# Patient Record
Sex: FEMALE | Race: WHITE | NOT HISPANIC OR LATINO | URBAN - METROPOLITAN AREA
[De-identification: names, ages, dates, MRNs, and addresses within clinical notes are randomized per-mention and may not be internally consistent; named-entity substitution may affect disease eponyms.]

---

## 2023-05-22 ENCOUNTER — HOSPITAL ENCOUNTER (OUTPATIENT)
Dept: DATA CONVERSION | Facility: HOSPITAL | Age: 23
End: 2023-05-23
Attending: STUDENT IN AN ORGANIZED HEALTH CARE EDUCATION/TRAINING PROGRAM | Admitting: STUDENT IN AN ORGANIZED HEALTH CARE EDUCATION/TRAINING PROGRAM
Payer: COMMERCIAL

## 2023-05-22 DIAGNOSIS — F17.200 NICOTINE DEPENDENCE, UNSPECIFIED, UNCOMPLICATED: ICD-10-CM

## 2023-05-22 DIAGNOSIS — M85.40 SOLITARY BONE CYST, UNSPECIFIED SITE: ICD-10-CM

## 2023-05-23 LAB
ANION GAP IN SER/PLAS: 13 MMOL/L (ref 10–20)
CALCIUM (MG/DL) IN SER/PLAS: 8.7 MG/DL (ref 8.6–10.6)
CARBON DIOXIDE, TOTAL (MMOL/L) IN SER/PLAS: 23 MMOL/L (ref 21–32)
CHLORIDE (MMOL/L) IN SER/PLAS: 107 MMOL/L (ref 98–107)
CREATININE (MG/DL) IN SER/PLAS: 0.67 MG/DL (ref 0.5–1.05)
ERYTHROCYTE DISTRIBUTION WIDTH (RATIO) BY AUTOMATED COUNT: 12.3 % (ref 11.5–14.5)
ERYTHROCYTE DISTRIBUTION WIDTH (RATIO) BY AUTOMATED COUNT: NORMAL
ERYTHROCYTE MEAN CORPUSCULAR HEMOGLOBIN CONCENTRATION (G/DL) BY AUTOMATED: 31.4 G/DL (ref 32–36)
ERYTHROCYTE MEAN CORPUSCULAR HEMOGLOBIN CONCENTRATION (G/DL) BY AUTOMATED: NORMAL
ERYTHROCYTE MEAN CORPUSCULAR VOLUME (FL) BY AUTOMATED COUNT: 98 FL (ref 80–100)
ERYTHROCYTE MEAN CORPUSCULAR VOLUME (FL) BY AUTOMATED COUNT: NORMAL
ERYTHROCYTES (10*6/UL) IN BLOOD BY AUTOMATED COUNT: 3.11 X10E12/L (ref 4–5.2)
ERYTHROCYTES (10*6/UL) IN BLOOD BY AUTOMATED COUNT: NORMAL
GFR FEMALE: >90 ML/MIN/1.73M2
GLUCOSE (MG/DL) IN SER/PLAS: 88 MG/DL (ref 74–99)
HEMATOCRIT (%) IN BLOOD BY AUTOMATED COUNT: 30.6 % (ref 36–46)
HEMATOCRIT (%) IN BLOOD BY AUTOMATED COUNT: NORMAL
HEMOGLOBIN (G/DL) IN BLOOD: 9.6 G/DL (ref 12–16)
HEMOGLOBIN (G/DL) IN BLOOD: NORMAL
LEUKOCYTES (10*3/UL) IN BLOOD BY AUTOMATED COUNT: 10.2 X10E9/L (ref 4.4–11.3)
LEUKOCYTES (10*3/UL) IN BLOOD BY AUTOMATED COUNT: NORMAL
NRBC (PER 100 WBCS) BY AUTOMATED COUNT: 0 /100 WBC (ref 0–0)
NRBC (PER 100 WBCS) BY AUTOMATED COUNT: NORMAL
PLATELETS (10*3/UL) IN BLOOD AUTOMATED COUNT: 292 X10E9/L (ref 150–450)
PLATELETS (10*3/UL) IN BLOOD AUTOMATED COUNT: NORMAL
POTASSIUM (MMOL/L) IN SER/PLAS: 3.9 MMOL/L (ref 3.5–5.3)
SODIUM (MMOL/L) IN SER/PLAS: 139 MMOL/L (ref 136–145)
UREA NITROGEN (MG/DL) IN SER/PLAS: 9 MG/DL (ref 6–23)

## 2023-05-30 LAB
COMPLETE PATHOLOGY REPORT: NORMAL
COMPLETE PATHOLOGY REPORT: NORMAL
CONVERTED CLINICAL DIAGNOSIS-HISTORY: NORMAL
CONVERTED CLINICAL DIAGNOSIS-HISTORY: NORMAL
CONVERTED DIAGNOSIS COMMENT: NORMAL
CONVERTED FINAL DIAGNOSIS: NORMAL
CONVERTED FINAL DIAGNOSIS: NORMAL
CONVERTED FINAL REPORT PDF LINK TO COPY AND PASTE: NORMAL
CONVERTED FINAL REPORT PDF LINK TO COPY AND PASTE: NORMAL
CONVERTED GROSS DESCRIPTION: NORMAL
CONVERTED SPECIMEN DESCRIPTION: NORMAL

## 2023-06-12 ENCOUNTER — HOSPITAL ENCOUNTER (OUTPATIENT)
Dept: DATA CONVERSION | Facility: HOSPITAL | Age: 23
End: 2023-06-12
Attending: STUDENT IN AN ORGANIZED HEALTH CARE EDUCATION/TRAINING PROGRAM | Admitting: STUDENT IN AN ORGANIZED HEALTH CARE EDUCATION/TRAINING PROGRAM
Payer: COMMERCIAL

## 2023-06-12 DIAGNOSIS — F17.200 NICOTINE DEPENDENCE, UNSPECIFIED, UNCOMPLICATED: ICD-10-CM

## 2023-06-12 DIAGNOSIS — Z79.1 LONG TERM (CURRENT) USE OF NON-STEROIDAL ANTI-INFLAMMATORIES (NSAID): ICD-10-CM

## 2023-06-12 DIAGNOSIS — M89.8X8 OTHER SPECIFIED DISORDERS OF BONE, OTHER SITE: ICD-10-CM

## 2023-06-12 DIAGNOSIS — M85.68 OTHER CYST OF BONE, OTHER SITE: ICD-10-CM

## 2023-06-14 LAB
GRAM STAIN: NORMAL
GRAM STAIN: NORMAL
TISSUE/WOUND CULTURE/SMEAR: NORMAL
TISSUE/WOUND CULTURE/SMEAR: NORMAL

## 2023-07-03 LAB
FUNGAL CULTURE/SMEAR: NORMAL
FUNGAL CULTURE/SMEAR: NORMAL
FUNGAL SMEAR: NORMAL
FUNGAL SMEAR: NORMAL

## 2023-09-07 VITALS
WEIGHT: 95.24 LBS | TEMPERATURE: 98.2 F | HEART RATE: 84 BPM | HEIGHT: 66 IN | BODY MASS INDEX: 15.31 KG/M2 | SYSTOLIC BLOOD PRESSURE: 118 MMHG | DIASTOLIC BLOOD PRESSURE: 69 MMHG | RESPIRATION RATE: 15 BRPM

## 2023-09-07 VITALS
HEART RATE: 87 BPM | SYSTOLIC BLOOD PRESSURE: 125 MMHG | BODY MASS INDEX: 15.77 KG/M2 | TEMPERATURE: 98.1 F | DIASTOLIC BLOOD PRESSURE: 74 MMHG | WEIGHT: 98.11 LBS | HEIGHT: 66 IN | RESPIRATION RATE: 16 BRPM

## 2023-09-30 NOTE — PROGRESS NOTES
Service: Orthopaedics     Subjective Data:   RHIANNA MCDANIEL is a 22 year old Female who is Hospital Day # 2 and POD #1 for 1. Right humerus curettage;2. Right humerus prophylactic fixation with allograft;3. ;4. ;5.     NAEON. Reports some pain in the RUE after block wore off. Denies n/v/cp/sob.    Objective Data:     Objective Information:      T   P  R  BP   MAP  SpO2   Value  37  68  15  131/73      95%  Date/Time 5/23 1:52 5/23 1:52 5/23 1:52 5/23 1:52    5/23 1:52  Range  (37C - 37.5C )  (68 - 93 )  (15 - 18 )  (117 - 131 )/ (71 - 74 )    (95% - 97% )  Highest temp of 37.5 C was recorded at 5/22 16:00      Pain reported at 5/23 5:16: 6 = Moderate    Physical Exam by System:    Constitutional: NAD   Eyes: Anicteric   ENMT: mucous membranes moist   Head/Neck: Neck supple, no apparent injury   Respiratory/Thorax: symmetric chest rise   Cardiovascular: RRR to peripheral palpation   Musculoskeletal: Right upper extremity:  - postoperative dressing c/d/i  - Motor and sensation intact M/U/R distributions.  - Cap refill < 2 seconds in all digits.   Psychological: Appropriate mood and affect     Medication:    Medications:          Continuous Medications       --------------------------------    1. Lactated Ringers Infusion:  1000  mL  IntraVenous  <Continuous>         Scheduled Medications       --------------------------------    1. Acetaminophen:  650  mg  Oral  Every 6 Hours    2. Calcium 500 mg - Vitamin D 200 Units:  1  tablet(s)  Oral  2 Times a Day    3. Cyclobenzaprine:  5  mg  Oral  3 Times a Day    4. Docusate:  100  mg  Oral  2 Times a Day    5. Polyethylene Glycol:  17  gram(s)  Oral  2 Times a Day    6. Sennosides:  1  tablet(s)  Oral  Daily         PRN Medications       --------------------------------    1. Bisacodyl Rectal:  10  mg  Rectal  Daily    2. diphenhydrAMINE:  25  mg  Oral  Every 6 Hours    3. HYDROmorphone Injectable:  0.5  mg  IntraVenous Push  Every 4 Hours    4. Ondansetron  Injectable:  4  mg  IntraVenous Push  Every 6 Hours    5. oxyCODONE Immediate Release:  5  mg  Oral  Every 4 Hours    6. oxyCODONE Immediate Release:  10  mg  Oral  Every 4 Hours    7. Promethazine IV Piggy Back:  12.5  mg  IntraVenous Piggyback  Every 6 Hours        Assessment and Plan:   Code Status:  ·  Code Status Full Code     Assessment:    22F s/p right humerus curettage and prophylactic fixation with allograft on 5/23/23 w/ Dr. Headley, w/ routine recovery    - Regular diet, bowel regimen  - Multimodal pain meds  - mIVFs, HLIV with good PO intake  - PT/OT consult; WB status: NWB RUE  - Encourage IS  - Periop abx  - Periop steroids  - No indication for transfusion  - SCDs/TEDs only, encourage ambulation  - No indications for ulcer prophylaxis    Dispo: Medically clear for d/c home today    This plan was discussed with attending surgeon Dr. Headley    Please do not hesitate to page with questions or concerns      Any Horton MD  Orthopaedic Surgery, PGY4  (doc halo preferred)  6pm-6am and on weekends page [phone]      Attestation:   Note Completion:  I am a:  Resident/Fellow   Attending Attestation I saw and evaluated the patient.  I personally obtained the key and critical portions of the history and physical exam or was physically present for key and  critical portions performed by the resident/fellow. I reviewed the resident/fellow?s documentation and discussed the patient with the resident/fellow.  I agree with the resident/fellow?s medical decision making as documented in the note.     I personally evaluated the patient on 23-May-2023         Electronic Signatures:  Any Horton (Resident))  (Signed 23-May-2023 07:11)   Authored: Service, Subjective Data, Objective Data, Assessment  and Plan, Note Completion  Otilio Headley)  (Signed 23-May-2023 08:15)   Authored: Note Completion   Co-Signer: Service, Subjective Data, Objective Data, Assessment and Plan, Note Completion      Last  Updated: 23-May-2023 08:15 by Otilio Headley)

## 2023-09-30 NOTE — H&P
History of Present Illness:   Pregnant/Lactating:  ·  Are You Pregnant no   ·  Are You Currently Breastfeeding no     History Present Illness:  Reason for surgery: Right humerus lesion   HPI:    22F with a right humerus lesion presenting for right humerus curettage and prophylactic fixation with allograft on 5/22 with Dr. Headley.      Allergies:        Allergies:  ·  No Known Allergies :     Home Medication Review:   Home Medications Reviewed: no     Impression/Procedure:   ·  Impression and Planned Procedure: right humerus curettage and prophylactic fixation with allograft       ERAS (Enhanced Recovery After Surgery):  ·  ERAS Patient: no       Vital Signs:  Temperature C: 36.7 degrees C   Temperature F: 98 degrees F   Heart Rate: 87 beats per minute   Respiratory Rate: 16 breath per minute   Blood Pressure Systolic: 125 mm/Hg   Blood Pressure Diastolic: 74 mm/Hg     Physical Exam by System:    Respiratory/Thorax: symmetric chest rise   Cardiovascular: RRR to peripheral palpation     Consent:   COVID-19 Consent:  ·  COVID-19 Risk Consent Surgeon has reviewed key risks related to the risk of jerrica COVID-19 and if they contract COVID-19 what the risks are.     Attestation:   Note Completion:  I am a:  Resident/Fellow   Attending Attestation I saw and evaluated the patient.  I personally obtained the key and critical portions of the history and physical exam or was physically present for key and  critical portions performed by the resident/fellow. I reviewed the resident/fellow?s documentation and discussed the patient with the resident/fellow.  I agree with the resident/fellow?s medical decision making as documented in the note.     I personally evaluated the patient on 22-May-2023         Electronic Signatures:  Any Horton (Resident))  (Signed 22-May-2023 06:55)   Authored: History of Present Illness, Allergies, Home  Medication Review, Impression/Procedure, ERAS, Physical Exam, Consent, Note  Completion  Otilio Headley)  (Signed 22-May-2023 07:16)   Authored: Note Completion   Co-Signer: History of Present Illness, Allergies, Home Medication Review, Impression/Procedure, ERAS, Physical Exam, Consent, Note Completion      Last Updated: 22-May-2023 07:16 by Otilio Headley)

## 2023-09-30 NOTE — DISCHARGE SUMMARY
Send Summary:   Discharge Summary Providers:  Provider Role Provider Name   · Attending Otilio Headley   · Referring Otilio Headley   · Primary Unknown, Pcp       Note Recipients: Otilio Headley MD  Unknown, Pcp, MD       Discharge:    Summary:   Admission Date: .22-May-2023 05:26:00   Discharge Date: 23-May-2023   Attending Physician at Discharge: Otilio Headley   Admission Reason: Right humerus lesion   Final Discharge Diagnoses: Lesion of right humerus   Procedures: Date: 22-May-2023 11:03:00  Procedure Name: 1. Right humerus curettage  2. Right humerus prophylactic fixation with allograft  3.   4.   5.   Condition at Discharge: Satisfactory   Disposition at Discharge: .Home   Hospital Course:    22 year-old female who presented with a right humerus lesion. Patient is now s/p right humerus curettage and prophylactic fixation with allograft on 5/22/23 by Dr. Headley. On the day of surgery, patient was identified in the pre-operative holding area and agreeable to proceed with surgery. Written consent was obtained.  Please see operative note for further details of this procedure. Patient received 24 hours of  nkechi-operative antibiotics. Patient recovered in the PACU before transfer to a regular nursing floor. Patient was started on oxycodone, tylenol, and morphine for pain control. Physical therapy recommended continued recovery at home with continued physical  therapy and wound care. On the day of discharge, patient was afebrile with stable vital signs. Patient was neurovascularly intact at time of discharge.  Patient will follow-up with Dr. Headley in 2 weeks for post-operative visit.       Discharge Information:    and Continuing Care:   Lab Results - Pending:    Surgical Pathology Drawn at 22-May-2023 08:28:00  Cytology-Non GYN Drawn at 22-May-2023 08:28:00  Radiology Results - Pending: None   Discharge Instructions:    Activity:           activity as tolerated.           May shower..  Postoperative dressing is water proof          No pushing, pulling, or lifting objects greater than 5 pounds until follow-up visit.            Weight-bearing Instructions: no weight-bearing right leg.            No active range of motion of the right shoulder, only passive.    Nutrition/Diet:           resume normal diet    Wound Care:           Wound Site:   Right humerus          Wound Type:   surgical incision          Instructions:   no lotions, creams, or tub soaks          Other Instructions:   Remove operative dressing from incision 7 days after surgery.  Wound may be open to air when dry. If there is continued drainage cover with dry sterile dressing. If the operative dressing was changed prior to 7 days post  op, then remove the dressing 7 days from the time it was placed.    Follow Up Appointments:    Follow-Up Appointment 01:           Physician/Dept/Service:   Dr. Headley/Orthopaedic Surgery          Reason for Referral:   Postoperative Visit          Call to Schedule in:   2 weeks    Discharge Medications: Home Medication   oxyCODONE 5 mg oral tablet - 1 tab(s) orally every 6 hours as needed for severe pain-.Meds to Beds   Colace 100 mg oral capsule - 1 cap(s) orally 2 times a day to help prevent constipation while taking narcotics-.Meds to Beds    acetaminophen 500 mg oral tablet - 2 tab(s) orally every 6 hours -.Meds to Beds   cyclobenzaprine 5 mg oral tablet - 1 tab(s) orally every 8 hours as needed for muscle spasms  -.Meds to Beds     PRN Medication   meloxicam 15 mg oral tablet - 1 tab(s) orally once a day with food, As Needed for pain     DNR Status:   ·  Code Status Code Status order at time of discharge: Full Code     Attestation:   Note Completion:  I am a:  Resident/Fellow   Attending Attestation I saw and evaluated the patient.  I personally obtained the key and critical portions of the history and physical exam or was physically present for key and  critical portions performed  by the resident/fellow. I reviewed the resident/fellow?s documentation and discussed the patient with the resident/fellow.  I agree with the resident/fellow?s medical decision making as documented in the note.     I personally evaluated the patient on 23-May-2023         Electronic Signatures:  Any Horton (Resident))  (Signed 23-May-2023 15:36)   Authored: Send Summary, Summary Content, Ongoing Care,  DNR Status, Note Completion  Otilio Headley)  (Signed 24-May-2023 12:55)   Authored: Note Completion   Co-Signer: Send Summary, Summary Content, Ongoing Care, DNR Status, Note Completion      Last Updated: 24-May-2023 12:55 by Otilio Headley)

## 2023-09-30 NOTE — PROGRESS NOTES
Service: Anesthesia - Pain     Subjective Data:   RHIANNA MCDANIEL is a 22 year old Female who is Hospital Day # 2 and POD #1 for 1. Right humerus curettage;2. Right humerus prophylactic fixation with allograft;3. ;4. ;5.    Additional Information:    Postop Pain HPI -   Palliative: relieved with IV analgesics and regional local anesthetics  Provocative: movement  Quality:  burning and aching  Radiation:  none  Severity:  7-10/10  Timing: constant    24-HOUR OPIOID CONSUMPTION: Oxycodone 5 mgx2    Non-narcotics: Tylenol      Objective Data:     Objective Information:      T   P  R  BP   MAP  SpO2   Value  37  68  15  131/73      95%  Date/Time 5/23 1:52 5/23 1:52 5/23 1:52 5/23 1:52    5/23 1:52  Range  (37C - 37.5C )  (68 - 93 )  (15 - 18 )  (117 - 131 )/ (71 - 74 )    (95% - 97% )  Highest temp of 37.5 C was recorded at 5/22 16:00      Pain reported at 5/23 5:16: 6 = Moderate    Physical Exam Narrative:  ·  Physical Exam:    Physical Exam:  Constitutional:  alert and cooperative  Eyes: clear sclera  Head/Neck: No apparent injury, trachea midline  Respiratory/Thorax: Patent airways, thorax symmetric, breathing comfortably  Cardiovascular: no pitting edema  Gastrointestinal: Nondistended  Musculoskeletal: ROM intact  Extremities: no clubbing  Neurological: alert, R extremity in sling  Psychological: Appropriate affect      Recent Lab Results:    Results:    CBC: 5/23/2023 06:00              \     Hgb     /                              \     9.6 L    /  WBC  ----------------  Plt               10.2       ----------------    292              /     Hct     \                              /     30.6 L    \            RBC: 3.11 L    MCV: 98           BMP: 5/23/2023 06:00  NA+        Cl-     BUN  /                         139    107    9  /  --------------------------------  Glucose                ---------------------------  88    K+     HCO3-   Creat \                         3.9  23    0.67  \  Calcium :  8.7     Anion Gap : 13      Assessment and Plan:   Daily Risk Screen:  ·  Does patient have an indwelling urinary catheter? n/a consulting service   ·  Does patient have a central line? n/a consulting service     Code Status:  ·  Code Status Full Code     Assessment:    RHIANNA MCDANIEL is a 22 year old Female  who presents for right humerus with allograft and prophylactic fixation  with Dr. Headley. Acute Pain consulted for block for postoperative pain control.     - Right sided interscalene single shot nerve block performed postoperatively on 5/22  - Add toradol 15mg IV q6H  - Additional pain medications per primary  - Will sign off    Acute Pain Resident  pg [phone] ph [phone].    Attestation:   Note Completion:  I am a:  Resident/Fellow   Attending Attestation I saw and evaluated the patient.  I personally obtained the key and critical portions of the history and physical exam or was physically present for key and  critical portions performed by the resident/fellow. I reviewed the resident/fellow?s documentation and discussed the patient with the resident/fellow.  I agree with the resident/fellow?s medical decision making as documented in the note.     I personally evaluated the patient on 23-May-2023         Electronic Signatures:  Alvin Maya)  (Signed 30-May-2023 09:41)   Authored: Note Completion   Co-Signer: Service, Subjective Data, Objective Data, Assessment and Plan, Note Completion  Dinesh Elder (Resident))  (Signed 23-May-2023 08:46)   Authored: Service, Subjective Data, Objective Data, Assessment  and Plan, Note Completion      Last Updated: 30-May-2023 09:41 by Alvin Maya)

## 2023-09-30 NOTE — H&P
History of Present Illness:   Pregnant/Lactating:  ·  Are You Pregnant no   ·  Are You Currently Breastfeeding no     History Present Illness:  Reason for surgery: Right humerus I&D and revision  bone grafting   HPI:    22F presenting for Right humerus I&D and revision bone grafting on 6/12/23 with Dr. Headley    Allergies:        Allergies:  ·  No Known Allergies :     Home Medication Review:   Home Medications Reviewed: yes     Impression/Procedure:   ·  Impression and Planned Procedure: Right humerus I&D and revision bone grafting       ERAS (Enhanced Recovery After Surgery):  ·  ERAS Patient: no       Physical Exam by System:    Respiratory/Thorax: symmetric chest rise   Cardiovascular: RRR to peripheral palpation     Consent:   COVID-19 Consent:  ·  COVID-19 Risk Consent Surgeon has reviewed key risks related to the risk of jerrica COVID-19 and if they contract COVID-19 what the risks are.     Attestation:   Note Completion:  I am a:  Resident/Fellow   Attending Attestation I saw and evaluated the patient.  I personally obtained the key and critical portions of the history and physical exam or was physically present for key and  critical portions performed by the resident/fellow. I reviewed the resident/fellow?s documentation and discussed the patient with the resident/fellow.  I agree with the resident/fellow?s medical decision making as documented in the note.     I personally evaluated the patient on 12-Jun-2023         Electronic Signatures:  Any Horton (Resident))  (Signed 12-Jun-2023 06:57)   Authored: History of Present Illness, Allergies, Home  Medication Review, Impression/Procedure, ERAS, Physical Exam, Consent, Note Completion  Otilio Headley)  (Signed 12-Jun-2023 11:48)   Authored: Note Completion   Co-Signer: History of Present Illness, Allergies, Home Medication Review, Impression/Procedure, ERAS, Physical Exam, Consent, Note Completion      Last Updated: 12-Jun-2023  11:48 by Otilio Headley)

## 2023-10-02 NOTE — OP NOTE
Post Operative Note:     PreOp Diagnosis: Right humerus lesion   Post-Procedure Diagnosis: Same as preop   Procedure: 1. Right humerus curettage  2. Right humerus prophylactic fixation with allograft  3.   4.   5.   Surgeon: Dr. Headley   Resident/Fellow/Other Assistant: ANA Horton   Estimated Blood Loss (mL): 250   Specimen: yes   Findings: Unicameral bone cyst   Additional Details: NWB RUE, ok for PASSIVE ROM of  the right shoulder  Ancef x 24 hrs  Standard pain regimen     Attestation:   Note Completion:  I am a: Resident/Fellow   Attending Attestation I was present for the entire procedure          Electronic Signatures:  Any Horton (Resident))  (Signed 22-May-2023 11:05)   Authored: Post Operative Note, Note Completion  Otilio Headley)  (Signed 22-May-2023 11:52)   Authored: Note Completion   Co-Signer: Post Operative Note, Note Completion      Last Updated: 22-May-2023 11:52 by Otilio Headley)

## 2023-10-02 NOTE — OP NOTE
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 Right humerus I&D  2. Right humerus bone grafting  3.   4.   5.   Surgeon: Dr. Headley   Resident/Fellow/Other Assistant: ANA Horton   Estimated Blood Loss (mL): 25   Specimen: yes   Findings: consistent with preoperative diagnosis     Attestation:   Note Completion:  I am a: Resident/Fellow   Attending Attestation I was present for the entire procedure          Electronic Signatures:  Any Horton (Resident))  (Signed 12-Jun-2023 15:30)   Authored: Post Operative Note, Note Completion  Otilio Headley)  (Signed 12-Jun-2023 15:37)   Authored: Note Completion   Co-Signer: Post Operative Note, Note Completion      Last Updated: 12-Jun-2023 15:37 by Otilio Headley)

## 2023-11-08 ENCOUNTER — APPOINTMENT (OUTPATIENT)
Dept: ORTHOPEDIC SURGERY | Facility: CLINIC | Age: 23
End: 2023-11-08

## 2023-11-15 ENCOUNTER — ANCILLARY PROCEDURE (OUTPATIENT)
Dept: RADIOLOGY | Facility: CLINIC | Age: 23
End: 2023-11-15
Payer: COMMERCIAL

## 2023-11-15 ENCOUNTER — OFFICE VISIT (OUTPATIENT)
Dept: ORTHOPEDIC SURGERY | Facility: CLINIC | Age: 23
End: 2023-11-15
Payer: COMMERCIAL

## 2023-11-15 VITALS — WEIGHT: 95 LBS | HEIGHT: 66 IN | BODY MASS INDEX: 15.27 KG/M2

## 2023-11-15 DIAGNOSIS — M85.40 SOLITARY BONE CYST, UNSPECIFIED SITE: ICD-10-CM

## 2023-11-15 DIAGNOSIS — M85.40 SIMPLE BONE CYST: Primary | ICD-10-CM

## 2023-11-15 PROCEDURE — 99213 OFFICE O/P EST LOW 20 MIN: CPT | Performed by: STUDENT IN AN ORGANIZED HEALTH CARE EDUCATION/TRAINING PROGRAM

## 2023-11-15 PROCEDURE — 73060 X-RAY EXAM OF HUMERUS: CPT | Mod: RIGHT SIDE | Performed by: RADIOLOGY

## 2023-11-15 PROCEDURE — 1036F TOBACCO NON-USER: CPT | Performed by: STUDENT IN AN ORGANIZED HEALTH CARE EDUCATION/TRAINING PROGRAM

## 2023-11-15 PROCEDURE — 73060 X-RAY EXAM OF HUMERUS: CPT | Mod: RT

## 2023-11-15 ASSESSMENT — ENCOUNTER SYMPTOMS
LOSS OF SENSATION IN FEET: 0
OCCASIONAL FEELINGS OF UNSTEADINESS: 0
DEPRESSION: 0

## 2023-11-15 ASSESSMENT — PAIN - FUNCTIONAL ASSESSMENT: PAIN_FUNCTIONAL_ASSESSMENT: NO/DENIES PAIN

## 2023-11-15 NOTE — PROGRESS NOTES
"Orthopaedic Surgery Clinic Progress Note:    CC: Right unicameral bone cyst    S: 23 y.o. female with a history of a right unicameral bone cyst treated with open curettage as well as bone grafting and prophylactic plate fixation. She was brought back to the OR immediately post operatively for bone graft spillage for irrigation debridement and revision bone grafting.  She states right now her pain is very well controlled.  Denies numbness or tingling.  Denies falls or trauma.  Otherwise doing well and back to her which she thinks is close to normal but does get a \"weird\" feeling in the arm every once in a while when doing activities that she thinks is maybe just a little bit of feeling uncomfortable on the arm.    Objective:    Exam:  Gen: alert, appropriately conversational, no apparent distress  Chest: symmetric chest rise, non-labored breathing  Heart: RRR to peripheral palpation    Physical exam of the right upper extremity shows healed surgical incision.  She is no pain with shoulder or elbow range of motion.  No tenderness palpation around the humerus.  No lymphadenopathy.  AIN, PIN, ulnar nerves are intact.  Sensation is intact light touch in all distributions.  She has palpable pulses and brisk capillary refill distally.    Imaging:  XR of the right humerus, obtained and personally reviewed today, shows good position of orthopedic components as well as increased consolidation of her bone graft throughout the entirety of the humerus.  No evidence of local recurrence at this time.    A/P:    At this point the patient is weightbearing as tolerated without any limitations from my standpoint.  Worsening increased consolidation over time and I like to continue to follow this up.  She is going to see us back in 6 months with repeat right humerus films.  "

## 2024-03-06 NOTE — CONSULTS
Service:   Service: Anesthesia - Pain     Consult:  Consult requested by (Attending Name): Dr. Headley   Reason: Postop pain     History of Present Illness:   HPI:    RHIANNA MCDANIEL is a 22 year old Female  who presents for right humerus with allograft and prophylactic fixation with Dr. Headley. Acute Pain consulted for block for postoperative pain control.     Anticipated Postop Pain Issues -   Palliative: typically relieved with IV analgesics and regional local anesthetics  Provocative: typically with movement  Quality: typically burning and aching  Radiation: typically none  Severity: typically severe 8-10/10  Timing: typically constant    PMH:  Left otitis media, otitis externa    PSH:  2 previous Ortho procedures    FHx:  Chronic migraines    SHx:  Endorsed vape pen use, denies alcohol use, and denies illicit drug use    Allergies: No known drug allergies    Review of Systems  Gen: No fatigue, anorexia, insomnia, fever.   Eyes: No vision loss, double vision, drainage, eye pain.   ENT: No pharyngitis, dry mouth, no hearing changes or ear discharge  Cardiac: No chest pain, palpitations, syncope, near syncope.   Pulmonary: No shortness of breath, cough, hemoptysis.   Heme/lymph: No swollen glands, fever, bleeding.   GI: No abdominal pain, change in bowel habits, melena, hematemesis, hematochezia, nausea, vomiting, diarrhea.   : No discharge, dysuria, frequency, urgency, hematuria.  Endo: No polyuria or weight loss.   Musculoskeletal: Negative for any pain or loss of ROM/weakness  Skin: No rashes or lesions  Neuro: Normal speech, no numbness or weakness. No gait difficulties  Review of systems is otherwise negative unless stated above or in history of present illness.             Allergies:  ·  No Known Allergies :     Objective:     Objective Information:    Physical Exam:  Constitutional:  no distress, alert and cooperative  Eyes: clear sclera  Head/Neck: No apparent injury, trachea  midline  Respiratory/Thorax: Patent airways, thorax symmetric, breathing comfortably  Cardiovascular: no pitting edema  Gastrointestinal: Nondistended  Musculoskeletal: ROM intact  Extremities: no clubbing  Neurological: alert, becerril x4  Psychological: Appropriate affect.      Assessment:    RHIANNA MCDANIEL is a 22 year old Female  who presents for right humerus with allograft and prophylactic fixation  with Dr. Headley. Acute Pain consulted for block for postoperative pain control.     Acute Pain consulted for block for postoperative pain control.     - Right sided interscalene single shot nerve block performed postoperatively on 5/22  - Pain medications per primary team  - Will see on POD1 if inpatient    Acute Pain Resident  pg [phone] ph [phone].    Attestation:   Note Completion:  I am a:  Resident/Fellow   Attending Attestation I saw and evaluated the patient.  I personally obtained the key and critical portions of the history and physical exam or was physically present for key and  critical portions performed by the resident/fellow. I reviewed the resident/fellow?s documentation and discussed the patient with the resident/fellow.  I agree with the resident/fellow?s medical decision making as documented in the note.     I personally evaluated the patient on 22-May-2023         Electronic Signatures:  Cristofer Frederick)  (Signed 22-May-2023 12:45)   Authored: Note Completion   Co-Signer: Service, History of Present Illness, Allergies, Objective, Assessment/Recommendations, Note Completion  Paulo Pimentel (Resident))  (Signed 22-May-2023 11:32)   Authored: Service, History of Present Illness, Allergies,  Objective, Assessment/Recommendations, Note Completion      Last Updated: 22-May-2023 12:45 by Cristofer Frederick)

## 2024-04-19 NOTE — OP NOTE
Preoperative diagnosis: Right humerus unicameral bone cyst, right humeral allograft spillage      Postoperative diagnosis: Same     Procedure: Irrigation debridement muscle of right arm (CPT 95961), curettage with allograft placement right humerus (CPT 38900)     Date of Procedure: 6/12/2023     Attending Surgeon: Otilio Headley MD     Assistants: Any Horton MD    Anesthesia: General     Estimated blood loss: 150 cc     Intraoperative findings: As above, significant graft spillage in the distal anterior soft tissues deep to brachialis as well as biceps     Components used: 33 cc of prosidyan fiber graft, 16 cc of montage     Indications:       We reviewed the informed consent process and form in the hospital and both signed.  The surgical site was signed and I performed a timeout in the operating room. The patient received prophylactic antibiotics as well as TXA prior to skin incision.     Details of procedure:  Patient was taken to the operating room and placed on the operating table in supine position.  At this point general anesthesia was administered.  After induction the anesthesia team to control the patient cervical spine as well as airway.  All bony prominences  were properly identified well-padded.  The right upper extremity was then prepped and draped in sterile orthopedic condition.  Once drapes were in place a timeout procedure was performed confirming appropriate patient identity, surgical site as well as  procedure to be performed.  Once all in the room were in agreement we would proceed with the case.    We were able to utilize the patient's previous anterolateral incision which was opened up over its entire length with a 10 blade followed by electrocautery to obtain hemostasis.  We were able to identify the cephalic vein which was medial to our fascial  incision.  Once we had opened up our incision we are able to visualize the previous suture line which closed the deltopectoral interval and  we are able to utilize this from a proximal to distal direction to then open up the deltopectoral interval proximally  but also release the fascia overlying the biceps distally.  This allowed the biceps to fall medially and the deltoid to be retracted laterally.  This brought us directly onto the brachialis which was previously repaired side to side.  This was also opened  up bringing us directly down to the trough that we previously placed over the anterior humerus.  It should be noted that once we were deep to the biceps we immediately encountered an abundant amount of tissue from the allograft as well as much of the  bioactive glass beads which were throughout the soft tissue.  We found an additional pocket once we opened up the brachialis distal and the incision much more distal than our trough.  We took multiple cultures at this time to rule out infection although  grossly there did not appear to be any at the time.  We then thoroughly debrided the allograft from the anterior soft tissues utilizing a combination of finger dissection as well as a curette.  We are able to bring our incision proximally up to the most  proximal aspect of the trough which we could visualize underneath her plate and once we done this we then were able to thoroughly clear out the soft tissue so there was no further allograft that we could palpate remaining anywhere in the soft tissues.   We then copiously irrigated 9 L of sterile saline throughout the entire wound to continue to flush out any remaining allograft components and at this point also took the opportunity to perform a recurettage of the cavity loosening up any of the loose  allograft component from a distal to proximal direction.  Once we are satisfied with removal of the loose allograft component within the humeral canal and were satisfied that we had removed all gross tumor within the soft tissues we then utilized fluoroscopic  imaging to obtain AP and lateral films which  confirmed that we had adequately removed the vast majority of the allograft in the soft tissues.  Once we had adequately curetted out the tissues within the humeral canal as well as verify that there was no  large amounts of allograft remaining in the soft tissue we then proceeded to open up an additional 33 cc of prosidyan fiber graft putty which we previously utilized and were able to repack the humeral canal to fill up what it previously spilled out.   We felt that we had adequate fill grossly and we purposefully left the trough a little bit underfilled so we could then fill it with montage which would harden to prevent additional spillage.  We utilized an AP fluoroscopic film of the shoulder to confirm  that we had adequately filled up the humeral head and that there is no grass pulling at the back.  Once we had filled up the remaining portion of the canal which was left open we then opened up our montage kit and initially inserted the nozzle into the  humeral head to obtain additional filling of the proximal humerus.  We injected montage under fluoroscopic guidance before then eventually pulling and also out and then placing a layer of montage along the entire trough to then seal and the remaining  portion of our graft which we had just placed.  We then were able to mobile this to the anterior aspect of the humerus as we waited for it to harden.  Final AP and lateral fluoroscopic views showed that we had adequately filled up the humerus and there  is no longer any concerning volume of allograft in the soft tissues.  We confirmed that the montage was hard before we began to irrigate and thoroughly irrigated another 3 L of sterile saline throughout the soft tissues.  Vancomycin powder was then placed  deep within the wound.  The brachialis was repaired utilizing a running 0 Vicryl suture.  The fascia overlying the biceps as well as the deltopectoral interval was then reapproximated again using a running 0 Vicryl  suture.  2-0 Vicryl suture was used  for subcutaneous tissue followed by running 4-0 subcuticular Monocryl stitch.  Dermabond was applied followed by Mepilex dressing.  Patient was placed back in her sling and awakened by anesthesia staff without complication.  She overall tolerated procedure  very well and suffered no major complications.  She was taken to PACU in stable condition.    Post Operative Plan: Patient will maintain her 5 pound weightbearing restriction but be encouraged to perform range of motion exercises.  She already has an occupational therapy referral and I instructed the mother and father that she should come out  of the sling daily for range of motion of the elbow, wrist and hand.  The sling should only be worn for comfort.  Should be discharged on appropriate pain medication and does not require DVT prophylaxis.  Plan for her will be to follow-up in 2 to 3 weeks  for repeat radiographs.     Note dictated with [employer]  transcription software. Completed without full typed error editing and sent to avoid delay.     Electronic Signatures:  Otilio Headley)  (Signed on 12-Jun-2023 15:06)   Authored    Last Updated: 12-Jun-2023 15:06 by Otilio Headley)

## 2024-04-19 NOTE — OP NOTE
Preoperative diagnosis: Right humerus unicameral bone cyst      Postoperative diagnosis: Same     Procedure: Open curettage with allograft placement right humerus (CPT 08431), prophylactic fixation right humerus (CPT 41151)     Date of Procedure: 5/22/2023     Attending Surgeon: Otilio Headley MD     Assistants: Any Horton MD    Anesthesia: General     Estimated blood loss: 200 cc     Intraoperative findings: As above     Components used: Synthes proximal humerus plate     Indications:     We reviewed the informed consent process and form in the hospital and both signed.  The surgical site was signed and I performed a timeout in the operating room. The patient received prophylactic antibiotics as well as TXA prior to skin incision.  I am including a modifier 22 for this case given the extremely large nature of the lesion which took up nearly 80% of the entire length of the humerus requiring an extensive exposure as well as long corticotomy in order to adequately curette the entire  lesion.  Additionally because of the deformity of the humerus secondary to the lesion this required additional time for the prophylactic fixation portion of the case as a plate needed to be bent and contoured to the native humerus because of his deformity.   This all added time as well as increased difficulty for this case.     Details of procedure:  Patient was taken to the operating room and placed on the operating table in supine position.  At this point general anesthesia was administered.  After induction the anesthesia team to control the patient cervical spine as well as airway.  All bony prominences  were properly identified well-padded.  The right upper extremity was then prepped and draped in sterile orthopedic condition.  Once drapes were in place a timeout procedure was performed confirming appropriate patient identity, surgical site as well as  procedure to be performed.  Once all in the room were in agreement we  would proceed with the case.    We were able to palpate the coracoid make a marked on the anterior aspect of the humerus transitioning from a deltopectoral incision down to an anterolateral approach.  Skin was incised with a 10 blade followed by electrocautery to obtain hemostasis.   We able to identify the cephalic vein which was able to be retracted medially through the entire length of the incision we were able to then identify the fascia overlying the biceps which was able to be opened up in line with our incision.  We were able  to dissect down almost to the level of the antecubital fossa because of the long exposure needed for this particular case.  Once the fascia overlying the bicep is been opened up we are then able to follow the cephalic vein up into the deltopectoral interval  which was then also developed in a brown retractor placed along the humeral head.  We were then able to identify the insertion of both the pectoralis as well as deltoid laterally.  A titrated release of the deltoid was performed so that we could adequately  expose the anterolateral aspect of the humerus.  Coming down further distally we were then able to retract the biceps medially and expose the brachialis beneath it.  We were able to identify and protect the musculocutaneous nerve and the brachialis was  split down at central portion to then expose the distal aspect of the humerus.  Coming further distally we were very careful as we dissected along the lateral aspect of the humerus to monitor for the radial nerve as we expected it across somewhere in  this region.  Once we had exposed the entire anterior aspect of the humerus we then were able to trace out a very long cortical trough which would be created from the distal aspect of the lesion all the way to the surgical neck of the humerus.  A 2.5  mm drill bit was then utilized to perforate the cortex along its entire trough and an osteotome was then utilized to create a trough  up the entire length of the lesion.  Immediately when we were able to perforate the cortex with a drill bit we were able  to encounter a large amount of normal-appearing yellow fluid consistent with unicameral bone cyst and this fluid was sampled and sent off for cytology.  Once we created the corticotomy were able to then remove the cortex of the bone which was very thin  and friable and this was removed.  This exposed the entire intramedullary aspect of the humerus we began to thoroughly curette out the lesion going from the distal aspect of the proximal aspect.  As we curetted out the lining we were able to take multiple  specimens were sent off for final pathology.  Once we felt that we had adequately and thoroughly debrided and curetted out the entire lesion we took fluoroscopic films to show the extent of our curettage.  High-speed bur was then utilized to touch up  all visible edges from the most distal aspect of the lesion all the way to the humeral head.  This was followed by thorough irrigation with sterile saline followed by a liter of Irrisept followed by additional sterile saline.  Once we were satisfied with  the extent of our curettage as well as burring we then were able to place greater than 80 cc of Prosidyan fiber graft into the defect to fill up the entirety of the defect.  AP and lateral fluoroscopic films were then obtained to show the extent of our  failed and we felt that we had very nicely filled out the entire lesion.  At this point we then chose an appropriately length Synthes proximal humerus locking plate which was identified based off of what would span the entire defect.  We then were able  to identify an area which would have to be contoured to the native humerus as there was deformity present because of the length of time this lesion had been in place.  Once we had identified that it was the right length we then were able to contour the  plate on the back table and verify that fit  along the humerus very nicely.  A small submuscular trough was then created underneath the deltoids that we could slide the plate from proximal to distal so that the deltoid remain attached to the humerus and  the plate would lie beneath the deltoid on the humeral shaft.  It was then pinned in the place and gross appropriate position and confirmed to be appropriate position on AP and lateral fluoroscopic films.  We were very careful as we slid the plate distally  to identify the bone and verify that the radial nerve was not sitting beneath her plate in this case.  We then drilled, measured and inserted multiple 3.5 mm locking screws in the proximal cluster of the plate confirmed they were all appropriate positioned  on fluoroscopic guidance.  This was followed by drilling, measuring inserted three 3.5 mm cortical screws in the distal aspect of the plate to suck it down to bone.  Prior to doing this we did check 1 final time that the radial nerve was not lined beneath  the plate prior to placing her hardware.  We then drilled, measured and inserted 2 unicortical locking screws in the shaft of the humerus but did not feel that we could get bicortical fixation as the bone was so thin and weak in this area.  Final AP and  lateral fluoroscopic views were obtained confirming excellent fill of the defect with her allograft as well as good position of all orthopedic implants.  The wound was then thoroughly irrigated with sterile saline.  A combination of vancomycin and tobramycin  powder were then placed deep in the wound along the hardware.  We were able to repair the brachialis split utilizing a running 0 Vicryl suture.  We then utilized multiple 0 Vicryl sutures to tack down the titrated release of the deltoid down to the plate  laterally confirming that the deltoid was in [place] and attached to bone still at the end of the case.  The fascia overlying the biceps as well as the interval of the deltopectoral fascia was then  reapproximated utilizing 0 Vicryl suture.  2-0 Vicryl suture  was used for subcutaneous tissue followed by running 3-0 Monocryl for subcuticular closure and Dermabond.  Mepilex was applied and patient was placed in a sling.  She was awakened by anesthesia staff without complication and overall tolerated procedure  well without any complications.  She was taken to PACU in stable condition.     Post Operative Plan: Patient will be nonweightbearing on the right upper extremity and be instructed to stay in the sling.  She can perform passive range of motion exercises of the right shoulder and will be encouraged to perform range of motion of the  elbow, wrist and fingers as tolerated.  Should be given appropriate pain medication at discharge and does not require DVT prophylaxis.  She should follow-up with me in 2 weeks to go over final pathology as well as for a wound check and radiographs.     Note dictated with [employer]  transcription software. Completed without full typed error editing and sent to avoid delay.     Electronic Signatures:  Otilio Headley)  (Signed on 22-May-2023 10:58)   Authored    Last Updated: 22-May-2023 10:58 by Otilio Headley)

## 2024-05-15 ENCOUNTER — HOSPITAL ENCOUNTER (OUTPATIENT)
Dept: RADIOLOGY | Facility: CLINIC | Age: 24
Discharge: HOME | End: 2024-05-15
Payer: COMMERCIAL

## 2024-05-15 ENCOUNTER — OFFICE VISIT (OUTPATIENT)
Dept: ORTHOPEDIC SURGERY | Facility: CLINIC | Age: 24
End: 2024-05-15
Payer: COMMERCIAL

## 2024-05-15 VITALS — HEIGHT: 66 IN | WEIGHT: 95 LBS | BODY MASS INDEX: 15.27 KG/M2

## 2024-05-15 DIAGNOSIS — M85.40 SIMPLE BONE CYST: ICD-10-CM

## 2024-05-15 PROBLEM — B34.9 VIRAL SYNDROME: Status: ACTIVE | Noted: 2024-05-15

## 2024-05-15 PROBLEM — B96.89 BACTERIAL CONJUNCTIVITIS OF BOTH EYES: Status: ACTIVE | Noted: 2024-05-15

## 2024-05-15 PROBLEM — N39.0 URINARY TRACT INFECTIOUS DISEASE: Status: ACTIVE | Noted: 2018-02-23

## 2024-05-15 PROBLEM — R05.9 COUGH: Status: ACTIVE | Noted: 2024-05-15

## 2024-05-15 PROBLEM — U07.1 COVID-19: Status: ACTIVE | Noted: 2024-05-15

## 2024-05-15 PROBLEM — E55.9 VITAMIN D DEFICIENCY: Status: ACTIVE | Noted: 2018-02-23

## 2024-05-15 PROBLEM — H10.9 BACTERIAL CONJUNCTIVITIS OF BOTH EYES: Status: ACTIVE | Noted: 2024-05-15

## 2024-05-15 PROCEDURE — 1036F TOBACCO NON-USER: CPT | Performed by: STUDENT IN AN ORGANIZED HEALTH CARE EDUCATION/TRAINING PROGRAM

## 2024-05-15 PROCEDURE — 73060 X-RAY EXAM OF HUMERUS: CPT | Mod: RIGHT SIDE | Performed by: STUDENT IN AN ORGANIZED HEALTH CARE EDUCATION/TRAINING PROGRAM

## 2024-05-15 PROCEDURE — 99213 OFFICE O/P EST LOW 20 MIN: CPT | Performed by: STUDENT IN AN ORGANIZED HEALTH CARE EDUCATION/TRAINING PROGRAM

## 2024-05-15 PROCEDURE — 73060 X-RAY EXAM OF HUMERUS: CPT | Mod: RT

## 2024-05-15 ASSESSMENT — PAIN - FUNCTIONAL ASSESSMENT
PAIN_FUNCTIONAL_ASSESSMENT: 0-10
PAIN_FUNCTIONAL_ASSESSMENT: NO/DENIES PAIN

## 2024-05-15 ASSESSMENT — ENCOUNTER SYMPTOMS
DEPRESSION: 0
OCCASIONAL FEELINGS OF UNSTEADINESS: 0
LOSS OF SENSATION IN FEET: 0

## 2024-05-15 ASSESSMENT — PAIN SCALES - GENERAL: PAINLEVEL_OUTOF10: 7

## 2024-05-15 NOTE — PROGRESS NOTES
[facility] Progress Note:    CC: Right unicameral bone cyst follow up    S: 23 y.o. female with a history of a right unicameral bone cyst treated with open curettage as well as bone grafting and prophylactic plate fixation on 6/12/23. She was brought back to the OR immediately post operatively for bone graft spillage for irrigation debridement and revision bone grafting.    Today she states that she is doing well overall.  She states some functional sore achy pain with activities such as vacuuming.  Occasionally she will have a sharp pain in her shoulder.  She denies any pain at rest.  She did states since being last seen she will occasionally develop hives right on the scar.  And they get rather large, painful and are very irritating.  She cannot associate the cause with any type of food or other topical applications. They seem to respond on their own. She denies any new injuries or other symptoms at this time.    Objective:    Exam:  Gen: alert, appropriately conversational, no apparent distress  Chest: symmetric chest rise, non-labored breathing  Heart: RRR to peripheral palpation    Physical exam of the right upper extremity shows healed surgical incision.  She is no pain with shoulder or elbow range of motion. Full elbow and shoulder range of motion. No tenderness palpation around the humerus.  No lymphadenopathy.  AIN, PIN, ulnar nerves are intact.  Sensation is intact light touch in all distributions.  She has palpable pulses and brisk capillary refill distally.    Imaging:  XR of the right humerus, obtained and personally reviewed today, shows intact hardware and healing/incorporation of the bone graft.    A/P:    Continued to encourage activity as she is able to tolerate. She can take OTC allergy medicine as need for hive reaction. She wants to continue to have regular follow up at this time and for that reason we'll plan for follow up in 6 months with repeat radiographs to continue to  monitor for recurrence and graft incorporation.

## 2024-07-30 ENCOUNTER — TELEPHONE (OUTPATIENT)
Dept: PEDIATRICS | Facility: CLINIC | Age: 24
End: 2024-07-30

## 2024-07-30 NOTE — TELEPHONE ENCOUNTER
Requested this go to you (mentioned your name), since Thor is gone  Hasn't been seen in over two years, wants to know if you have any recommendations for an adult physician

## 2024-08-08 PROBLEM — M85.40 SOLITARY BONE CYST: Status: ACTIVE | Noted: 2024-08-08

## 2024-08-08 PROBLEM — M25.629 DECREASED RANGE OF MOTION OF ELBOW: Status: ACTIVE | Noted: 2024-08-08

## 2024-08-08 PROBLEM — M25.619 DECREASED RANGE OF MOTION OF SHOULDER: Status: ACTIVE | Noted: 2024-08-08

## 2024-08-08 PROBLEM — M85.60 BONE CYST: Status: ACTIVE | Noted: 2024-08-08

## 2024-08-08 PROBLEM — N39.0 URINARY TRACT INFECTION: Status: ACTIVE | Noted: 2018-02-23

## 2024-08-08 PROBLEM — T86.90: Status: ACTIVE | Noted: 2024-08-08

## 2024-08-08 PROBLEM — M89.9 DISORDER OF BONE: Status: ACTIVE | Noted: 2024-08-08

## 2024-08-08 PROBLEM — M89.9 LESION OF RIGHT HUMERUS: Status: ACTIVE | Noted: 2024-08-08

## 2024-08-08 PROBLEM — H10.9 BACTERIAL CONJUNCTIVITIS: Status: ACTIVE | Noted: 2024-05-15

## 2024-08-08 PROBLEM — M25.511 PAIN OF RIGHT SHOULDER REGION: Status: ACTIVE | Noted: 2024-08-08

## 2024-08-08 PROBLEM — B34.9 VIRAL INFECTION: Status: ACTIVE | Noted: 2024-05-15

## 2024-08-08 PROBLEM — F17.200 NICOTINE DEPENDENCE: Status: ACTIVE | Noted: 2024-08-08

## 2024-08-09 ENCOUNTER — APPOINTMENT (OUTPATIENT)
Dept: PRIMARY CARE | Facility: CLINIC | Age: 24
End: 2024-08-09

## 2024-11-13 ENCOUNTER — APPOINTMENT (OUTPATIENT)
Dept: ORTHOPEDIC SURGERY | Facility: CLINIC | Age: 24
End: 2024-11-13
Payer: COMMERCIAL

## 2024-11-13 ENCOUNTER — HOSPITAL ENCOUNTER (OUTPATIENT)
Dept: RADIOLOGY | Facility: CLINIC | Age: 24
Discharge: HOME | End: 2024-11-13
Payer: COMMERCIAL

## 2024-11-13 ENCOUNTER — OFFICE VISIT (OUTPATIENT)
Dept: ORTHOPEDIC SURGERY | Facility: CLINIC | Age: 24
End: 2024-11-13
Payer: COMMERCIAL

## 2024-11-13 VITALS — HEIGHT: 66 IN | BODY MASS INDEX: 15.27 KG/M2 | WEIGHT: 95 LBS

## 2024-11-13 DIAGNOSIS — M85.40 SIMPLE BONE CYST: ICD-10-CM

## 2024-11-13 PROCEDURE — 73060 X-RAY EXAM OF HUMERUS: CPT | Mod: RT

## 2024-11-13 PROCEDURE — 3008F BODY MASS INDEX DOCD: CPT | Performed by: STUDENT IN AN ORGANIZED HEALTH CARE EDUCATION/TRAINING PROGRAM

## 2024-11-13 PROCEDURE — 73060 X-RAY EXAM OF HUMERUS: CPT | Mod: RIGHT SIDE | Performed by: RADIOLOGY

## 2024-11-13 PROCEDURE — 1036F TOBACCO NON-USER: CPT | Performed by: STUDENT IN AN ORGANIZED HEALTH CARE EDUCATION/TRAINING PROGRAM

## 2024-11-13 PROCEDURE — 99417 PROLNG OP E/M EACH 15 MIN: CPT | Performed by: STUDENT IN AN ORGANIZED HEALTH CARE EDUCATION/TRAINING PROGRAM

## 2024-11-13 PROCEDURE — 99213 OFFICE O/P EST LOW 20 MIN: CPT | Performed by: STUDENT IN AN ORGANIZED HEALTH CARE EDUCATION/TRAINING PROGRAM

## 2024-11-13 ASSESSMENT — PAIN - FUNCTIONAL ASSESSMENT: PAIN_FUNCTIONAL_ASSESSMENT: NO/DENIES PAIN

## 2024-11-13 NOTE — PROGRESS NOTES
[facility] Surgery Clinic Progress Note:    CC: Right unicameral bone cyst follow up    S: 24 y.o. female with a history of a right unicameral bone cyst treated with open curettage as well as bone grafting and prophylactic plate fixation on 6/12/23. She was brought back to the OR immediately post operatively for bone graft spillage for irrigation debridement and revision bone grafting.      Today she states that she is doing well.  Occasionally she will have a pinching pain near her elbow.  She denies any pain at rest.  She still occasionally develop hives right on the scar as she mentioned at her last visit. She denies any new injuries or other symptoms at this time.    Objective:    Exam:  Gen: alert, appropriately conversational, no apparent distress  Chest: symmetric chest rise, non-labored breathing  Heart: RRR to peripheral palpation    Physical exam of the right upper extremity shows healed surgical incision.  She is no pain with shoulder or elbow range of motion. Full elbow and shoulder range of motion. No tenderness palpation around the humerus.  No lymphadenopathy.  AIN, PIN, ulnar nerves are intact.  Sensation is intact light touch in all distributions.  She has palpable pulses and brisk capillary refill distally.    Imaging:  XR of the right humerus, obtained and personally reviewed today, shows intact hardware and healing/incorporation of the bone graft.    A/P:    Continued to encourage activity as tolerated. We discussed that given her x-rays have been stable, we can now extend our visits to annual monitoring. We will plan for follow up in 1 year with repeat radiographs to continue to monitor for recurrence and graft incorporation. She was in agreement with plan, all questions answered.

## 2024-11-21 ENCOUNTER — APPOINTMENT (OUTPATIENT)
Dept: PRIMARY CARE | Facility: CLINIC | Age: 24
End: 2024-11-21
Payer: COMMERCIAL

## 2024-11-21 VITALS
WEIGHT: 106.75 LBS | OXYGEN SATURATION: 97 % | HEIGHT: 66 IN | BODY MASS INDEX: 17.16 KG/M2 | DIASTOLIC BLOOD PRESSURE: 70 MMHG | SYSTOLIC BLOOD PRESSURE: 110 MMHG | HEART RATE: 76 BPM

## 2024-11-21 DIAGNOSIS — Z00.00 WELL ADULT EXAM: ICD-10-CM

## 2024-11-21 DIAGNOSIS — R00.2 PALPITATIONS: ICD-10-CM

## 2024-11-21 DIAGNOSIS — Z00.00 HEALTH CARE MAINTENANCE: ICD-10-CM

## 2024-11-21 DIAGNOSIS — E55.9 VITAMIN D DEFICIENCY: Primary | ICD-10-CM

## 2024-11-21 PROCEDURE — 3008F BODY MASS INDEX DOCD: CPT | Performed by: STUDENT IN AN ORGANIZED HEALTH CARE EDUCATION/TRAINING PROGRAM

## 2024-11-21 PROCEDURE — 90471 IMMUNIZATION ADMIN: CPT | Performed by: STUDENT IN AN ORGANIZED HEALTH CARE EDUCATION/TRAINING PROGRAM

## 2024-11-21 PROCEDURE — 90715 TDAP VACCINE 7 YRS/> IM: CPT | Performed by: STUDENT IN AN ORGANIZED HEALTH CARE EDUCATION/TRAINING PROGRAM

## 2024-11-21 PROCEDURE — 99395 PREV VISIT EST AGE 18-39: CPT | Performed by: STUDENT IN AN ORGANIZED HEALTH CARE EDUCATION/TRAINING PROGRAM

## 2024-11-21 PROCEDURE — 1036F TOBACCO NON-USER: CPT | Performed by: STUDENT IN AN ORGANIZED HEALTH CARE EDUCATION/TRAINING PROGRAM

## 2024-11-21 ASSESSMENT — PATIENT HEALTH QUESTIONNAIRE - PHQ9
2. FEELING DOWN, DEPRESSED OR HOPELESS: NOT AT ALL
1. LITTLE INTEREST OR PLEASURE IN DOING THINGS: NOT AT ALL
SUM OF ALL RESPONSES TO PHQ9 QUESTIONS 1 AND 2: 0

## 2024-11-21 NOTE — PROGRESS NOTES
Subjective   Patient ID: Jennifer Manuel is a 24 y.o. female who presents for Establish Care (Multiple concerns - episodes of tachycardia, apple watch will notify that it is afib, recurrent pink eye).  HPI  Jennifer is here to establish care.    She reports episodes of palpitations, more frequently in the last couple of months. Episodes will occur at random every 2-8 weeks. Palpitations last ~ seconds-minutes at a time, longest stretch was ~1.5 hours. She will feel associated short of breath, pressure sensation when they occur. Denies chest pain, denies noticing any specific triggers.    She also reports constipation. Does not drink a lot of water, eats fast food frequently. Does enjoy vegetables and cooks more on her days off of work.    PMHx: None  SurgHx: bone cyst surgery x3  FamHx: HTN - grandmother  SocialHx: Never smoker. Vapes nicotine. Never EtOH use. No drug use. Lives at home with boyfriend. Sexually active. Not using protection. Working as a .    Review of Systems  12-point ROS was reviewed and is negative, unless otherwise noted in HPI    Objective   Vitals:    11/21/24 1149   BP: 110/70   Pulse: 76   SpO2: 97%      Physical Exam  GEN: alert, conversant, NAD  HEENT: PERRL, EOMI, MMM, Tms pearly gray bilaterally  NECK: supple, no LAD appreciated  CHEST: CTAB  CV: S1, S2, RRR, no murmurs appreciated  ABD: soft, NT, ND  EXT: no significant LE edema  SKIN: warm, dry    Assessment/Plan   #well adult  - Counseled continued efforts on healthy lifestyle modification including balanced diet, and continued exercise for >5 minutes  - counseled age appropriate vaccines and preventative measures    #palpitations  - concern for possible SVT.   Plan:  - discussed vagal maneuvers in office.  - obtain labs, cardiac event monitor ordered today  - follow up with Cardiology  - increase water, modify stress why able, limit caffeine and avoid nicotine    #nicotine use disorder  Vaping  - Advised cessation,  counseled cessation tips, offer cessation aids  - Spent >5 minutes counseling vaping cessation    #constipation  - increase PO water intake, soluble fiber intake    Health Maintenance:  Vaccines: COVID (declines), Flu (declines), TDAP (update today)  Screening: Paptest (referral to GYN)  Labs: Obtain today     RTC in 12 months, or sooner PRN    Lei De La Cruz, DO

## 2024-11-26 ENCOUNTER — LAB (OUTPATIENT)
Dept: LAB | Facility: LAB | Age: 24
End: 2024-11-26
Payer: COMMERCIAL

## 2024-11-26 DIAGNOSIS — E55.9 VITAMIN D DEFICIENCY: ICD-10-CM

## 2024-11-26 DIAGNOSIS — Z00.00 HEALTH CARE MAINTENANCE: ICD-10-CM

## 2024-11-26 DIAGNOSIS — R00.2 PALPITATIONS: ICD-10-CM

## 2024-11-26 LAB
25(OH)D3 SERPL-MCNC: 9 NG/ML (ref 30–100)
ALBUMIN SERPL BCP-MCNC: 4.9 G/DL (ref 3.4–5)
ALP SERPL-CCNC: 49 U/L (ref 33–110)
ALT SERPL W P-5'-P-CCNC: 15 U/L (ref 7–45)
ANION GAP SERPL CALC-SCNC: 11 MMOL/L (ref 10–20)
AST SERPL W P-5'-P-CCNC: 15 U/L (ref 9–39)
BILIRUB SERPL-MCNC: 0.5 MG/DL (ref 0–1.2)
BUN SERPL-MCNC: 12 MG/DL (ref 6–23)
CALCIUM SERPL-MCNC: 9.6 MG/DL (ref 8.6–10.6)
CHLORIDE SERPL-SCNC: 107 MMOL/L (ref 98–107)
CHOLEST SERPL-MCNC: 136 MG/DL (ref 0–199)
CHOLESTEROL/HDL RATIO: 3.2
CO2 SERPL-SCNC: 25 MMOL/L (ref 21–32)
CREAT SERPL-MCNC: 0.64 MG/DL (ref 0.5–1.05)
EGFRCR SERPLBLD CKD-EPI 2021: >90 ML/MIN/1.73M*2
ERYTHROCYTE [DISTWIDTH] IN BLOOD BY AUTOMATED COUNT: 12.4 % (ref 11.5–14.5)
GLUCOSE SERPL-MCNC: 127 MG/DL (ref 74–99)
HCT VFR BLD AUTO: 42.8 % (ref 36–46)
HDLC SERPL-MCNC: 42.4 MG/DL
HGB BLD-MCNC: 13.7 G/DL (ref 12–16)
LDLC SERPL CALC-MCNC: 83 MG/DL
MAGNESIUM SERPL-MCNC: 2.3 MG/DL (ref 1.6–2.4)
MCH RBC QN AUTO: 30.9 PG (ref 26–34)
MCHC RBC AUTO-ENTMCNC: 32 G/DL (ref 32–36)
MCV RBC AUTO: 96 FL (ref 80–100)
NON HDL CHOLESTEROL: 94 MG/DL (ref 0–149)
NRBC BLD-RTO: 0 /100 WBCS (ref 0–0)
PLATELET # BLD AUTO: 349 X10*3/UL (ref 150–450)
POTASSIUM SERPL-SCNC: 4.3 MMOL/L (ref 3.5–5.3)
PROT SERPL-MCNC: 7.3 G/DL (ref 6.4–8.2)
RBC # BLD AUTO: 4.44 X10*6/UL (ref 4–5.2)
SODIUM SERPL-SCNC: 139 MMOL/L (ref 136–145)
TRIGL SERPL-MCNC: 52 MG/DL (ref 0–149)
TSH SERPL-ACNC: 1.2 MIU/L (ref 0.44–3.98)
VLDL: 10 MG/DL (ref 0–40)
WBC # BLD AUTO: 6 X10*3/UL (ref 4.4–11.3)

## 2024-11-26 PROCEDURE — 83735 ASSAY OF MAGNESIUM: CPT

## 2024-11-26 PROCEDURE — 80061 LIPID PANEL: CPT

## 2024-11-26 PROCEDURE — 84443 ASSAY THYROID STIM HORMONE: CPT

## 2024-11-26 PROCEDURE — 82306 VITAMIN D 25 HYDROXY: CPT

## 2024-11-26 PROCEDURE — 80053 COMPREHEN METABOLIC PANEL: CPT

## 2024-11-26 PROCEDURE — 36415 COLL VENOUS BLD VENIPUNCTURE: CPT

## 2024-11-26 PROCEDURE — 85027 COMPLETE CBC AUTOMATED: CPT

## 2024-11-27 DIAGNOSIS — E55.9 VITAMIN D DEFICIENCY: Primary | ICD-10-CM

## 2024-11-27 RX ORDER — ERGOCALCIFEROL 1.25 MG/1
50000 CAPSULE ORAL WEEKLY
Qty: 12 CAPSULE | Refills: 1 | Status: SHIPPED | OUTPATIENT
Start: 2024-11-27 | End: 2025-05-26

## 2024-12-06 ENCOUNTER — HOSPITAL ENCOUNTER (OUTPATIENT)
Dept: CARDIOLOGY | Facility: HOSPITAL | Age: 24
Discharge: HOME | End: 2024-12-06
Payer: COMMERCIAL

## 2024-12-06 DIAGNOSIS — R00.2 PALPITATIONS: ICD-10-CM

## 2024-12-06 PROCEDURE — 93246 EXT ECG>7D<15D RECORDING: CPT

## 2024-12-13 PROBLEM — R00.2 PALPITATIONS: Status: ACTIVE | Noted: 2024-12-13

## 2024-12-13 PROBLEM — B34.9 VIRAL INFECTION: Status: RESOLVED | Noted: 2024-05-15 | Resolved: 2024-12-13

## 2024-12-13 PROBLEM — M85.60 BONE CYST: Status: RESOLVED | Noted: 2024-08-08 | Resolved: 2024-12-13

## 2024-12-13 PROBLEM — B34.9 VIRAL SYNDROME: Status: RESOLVED | Noted: 2024-05-15 | Resolved: 2024-12-13

## 2024-12-13 PROBLEM — M89.9 DISORDER OF BONE: Status: RESOLVED | Noted: 2024-08-08 | Resolved: 2024-12-13

## 2024-12-13 PROBLEM — H10.9 BACTERIAL CONJUNCTIVITIS: Status: RESOLVED | Noted: 2024-05-15 | Resolved: 2024-12-13

## 2024-12-13 PROBLEM — N39.0 URINARY TRACT INFECTIOUS DISEASE: Status: RESOLVED | Noted: 2018-02-23 | Resolved: 2024-12-13

## 2024-12-13 PROBLEM — U07.1 COVID-19: Status: RESOLVED | Noted: 2024-05-15 | Resolved: 2024-12-13

## 2024-12-27 ENCOUNTER — OFFICE VISIT (OUTPATIENT)
Dept: CARDIOLOGY | Facility: CLINIC | Age: 24
End: 2024-12-27
Payer: COMMERCIAL

## 2024-12-27 VITALS
WEIGHT: 103 LBS | DIASTOLIC BLOOD PRESSURE: 80 MMHG | OXYGEN SATURATION: 96 % | HEART RATE: 81 BPM | SYSTOLIC BLOOD PRESSURE: 100 MMHG | HEIGHT: 66 IN | BODY MASS INDEX: 16.55 KG/M2

## 2024-12-27 DIAGNOSIS — R00.2 PALPITATIONS: Primary | ICD-10-CM

## 2024-12-27 DIAGNOSIS — I47.10 PSVT (PAROXYSMAL SUPRAVENTRICULAR TACHYCARDIA) (CMS-HCC): ICD-10-CM

## 2024-12-27 LAB
ATRIAL RATE: 66 BPM
P AXIS: 68 DEGREES
P OFFSET: 197 MS
P ONSET: 150 MS
PR INTERVAL: 140 MS
Q ONSET: 220 MS
QRS COUNT: 11 BEATS
QRS DURATION: 80 MS
QT INTERVAL: 374 MS
QTC CALCULATION(BAZETT): 392 MS
QTC FREDERICIA: 386 MS
R AXIS: 85 DEGREES
T AXIS: 56 DEGREES
T OFFSET: 407 MS
VENTRICULAR RATE: 66 BPM

## 2024-12-27 PROCEDURE — 93005 ELECTROCARDIOGRAM TRACING: CPT | Performed by: INTERNAL MEDICINE

## 2024-12-27 PROCEDURE — 3008F BODY MASS INDEX DOCD: CPT | Performed by: INTERNAL MEDICINE

## 2024-12-27 PROCEDURE — 99213 OFFICE O/P EST LOW 20 MIN: CPT | Performed by: INTERNAL MEDICINE

## 2024-12-27 PROCEDURE — 99203 OFFICE O/P NEW LOW 30 MIN: CPT | Performed by: INTERNAL MEDICINE

## 2024-12-27 NOTE — PROGRESS NOTES
"Subjective   Jennifer Manuel is a 24 y.o. female.    Chief Complaint:  Palpitations and tachycardia.    HPI    She reports with complaints of palpitations and tachycardia.  She has had the symptoms for the past 9 years.  More recently she has had episodes where she has had the tachycardia for about an hour or more.  She becomes dizzy and lightheaded and times feels like she might faint.  She has an Apple watch and is able to download her rhythm disturbance to her phone.  She is here for evaluation of her arrhythmia.    Past cardiac history: Otherwise unremarkable.  No history of any congenital coronary disease.    Past medical history: History of a bony cyst required multiple surgical procedure.    Allergies to medications: None known.    Social history: She is single.  Lives with her boyfriend.  Non-smoker but does vape.    Family history: No family history of cardiac arrhythmias.    Review of Systems   Cardiovascular:  Positive for irregular heartbeat and palpitations.       Current Outpatient Medications   Medication Sig Dispense Refill    ergocalciferol (Vitamin D-2) 1.25 MG (77126 UT) capsule Take 1 capsule (50,000 Units) by mouth 1 (one) time per week. 12 capsule 1     No current facility-administered medications for this visit.        Visit Vitals  /80 (BP Location: Right arm)   Pulse 81   Ht 1.676 m (5' 6\")   Wt 46.7 kg (103 lb)   SpO2 96%   BMI 16.62 kg/m²   Smoking Status Never   BSA 1.47 m²        Objective     Constitutional:       Appearance: Not in distress.   Neck:      Vascular: JVD normal.   Pulmonary:      Breath sounds: Normal breath sounds.   Cardiovascular:      Normal rate. Regular rhythm. Normal S1. Normal S2.       Murmurs: There is no murmur.      No gallop.    Pulses:     Intact distal pulses.   Edema:     Peripheral edema absent.   Abdominal:      General: There is no distension.      Palpations: Abdomen is soft.   Neurological:      Mental Status: Alert.         Lab Review:   Lab " Results   Component Value Date     11/26/2024    K 4.3 11/26/2024     11/26/2024    CO2 25 11/26/2024    BUN 12 11/26/2024    CREATININE 0.64 11/26/2024    GLUCOSE 127 (H) 11/26/2024    CALCIUM 9.6 11/26/2024       Assessment:    1.  Paroxysmal supraventricular tachycardia.  Her iPhone demonstrates SVT at a rate of about 180.  Since it appears to becoming more frequent and more sustained, we will refer to electrophysiology service for possible ablation procedure.  She is currently wearing a Holter monitor.    We will refer this patient for follow-up with electrophysiology.  Will see her on an as-needed basis.

## 2024-12-29 ASSESSMENT — ENCOUNTER SYMPTOMS
IRREGULAR HEARTBEAT: 1
PALPITATIONS: 1

## 2025-01-13 ENCOUNTER — HOSPITAL ENCOUNTER (OUTPATIENT)
Dept: CARDIOLOGY | Facility: CLINIC | Age: 25
Discharge: HOME | End: 2025-01-13
Payer: COMMERCIAL

## 2025-01-13 VITALS
DIASTOLIC BLOOD PRESSURE: 80 MMHG | WEIGHT: 103 LBS | BODY MASS INDEX: 16.55 KG/M2 | SYSTOLIC BLOOD PRESSURE: 100 MMHG | HEIGHT: 66 IN

## 2025-01-13 PROCEDURE — 93325 DOPPLER ECHO COLOR FLOW MAPG: CPT

## 2025-01-13 PROCEDURE — 93308 TTE F-UP OR LMTD: CPT | Performed by: INTERNAL MEDICINE

## 2025-01-13 PROCEDURE — 93321 DOPPLER ECHO F-UP/LMTD STD: CPT | Performed by: INTERNAL MEDICINE

## 2025-01-13 PROCEDURE — 93325 DOPPLER ECHO COLOR FLOW MAPG: CPT | Performed by: INTERNAL MEDICINE

## 2025-01-14 LAB
AORTIC VALVE MEAN GRADIENT: 3 MMHG
AORTIC VALVE PEAK VELOCITY: 1.15 M/S
AV PEAK GRADIENT: 5 MMHG
AVA (PEAK VEL): 2.4 CM2
AVA (VTI): 2.35 CM2
EJECTION FRACTION APICAL 4 CHAMBER: 43.3
EJECTION FRACTION: 55 %
LEFT ATRIUM VOLUME AREA LENGTH INDEX BSA: 21.3 ML/M2
LEFT VENTRICLE INTERNAL DIMENSION DIASTOLE: 3.61 CM (ref 3.5–6)
LEFT VENTRICULAR OUTFLOW TRACT DIAMETER: 1.86 CM
MITRAL VALVE E/A RATIO: 1.79
RIGHT VENTRICLE FREE WALL PEAK S': 1.1 CM/S
TRICUSPID ANNULAR PLANE SYSTOLIC EXCURSION: 2.4 CM

## 2025-01-17 ENCOUNTER — PATIENT MESSAGE (OUTPATIENT)
Dept: CARDIOLOGY | Facility: CLINIC | Age: 25
End: 2025-01-17
Payer: COMMERCIAL

## 2025-01-17 DIAGNOSIS — I47.10 PSVT (PAROXYSMAL SUPRAVENTRICULAR TACHYCARDIA) (CMS-HCC): Primary | ICD-10-CM

## 2025-01-17 DIAGNOSIS — R00.2 PALPITATIONS: ICD-10-CM

## 2025-01-21 RX ORDER — ATENOLOL 25 MG/1
12.5 TABLET ORAL 2 TIMES DAILY
Qty: 30 TABLET | Refills: 11 | Status: SHIPPED | OUTPATIENT
Start: 2025-01-21 | End: 2026-01-21

## 2025-01-21 NOTE — PROGRESS NOTES
"      Reason For Consult:    Supraventricular tachycardia    History Of Present Illness:    This is a 24-year-old female with palpitations.  She is being seen today in consultation with the request of Dr. Mcclain.  The patient reports having a history of SVT dating back nearly 10 years.  The episodes occur approximately once per month and last anywhere from 5 minutes up to 1 hour in duration.  The arrhythmia episodes start and stop abruptly.  She has not required intravenous adenosine for arrhythmia termination.  She has not tried vagal maneuvers.  Cardiology records reviewed today.    Past surgical history: History of a bony cyst requiring surgery    Social history: Non-smoker    Family history: Negative for premature CAD.  Negative for cardiac arrhythmias    Review of systems:  Other review of systems negative other than as outlined in HPI    Social History:  She reports that she has never smoked. She has never used smokeless tobacco. She reports that she does not drink alcohol and does not use drugs.    Family History:  Family History   Problem Relation Name Age of Onset    Other (htn) Maternal Grandmother         Allergies:  Patient has no known allergies.    Medications:  Current Outpatient Medications   Medication Instructions    atenolol (TENORMIN) 12.5 mg, oral, 2 times daily    ergocalciferol (VITAMIN D-2) 50,000 Units, oral, Weekly       Vitals:      6/12/2023    12:09 PM 11/15/2023    12:04 PM 5/15/2024    11:39 AM 11/13/2024     1:17 PM 11/21/2024    11:49 AM 12/27/2024     1:29 PM 1/13/2025     2:12 PM   Vitals   Systolic 118    110 100 100   Diastolic 69    70 80 80   BP Location      Right arm    Heart Rate 84    76 81    Temp 36.8 °C (98.2 °F)         Resp 15         Height  1.676 m (5' 6\") 1.676 m (5' 6\") 1.676 m (5' 6\") 1.676 m (5' 6\") 1.676 m (5' 6\") 1.676 m (5' 6\")   Weight (lb)  95 95 95 106.75 103 103   BMI  15.33 kg/m2 15.33 kg/m2 15.33 kg/m2 17.23 kg/m2 16.62 kg/m2 16.62 kg/m2   BSA (m2)  1.42 " "m2 1.42 m2 1.42 m2 1.5 m2 1.47 m2 1.47 m2   Visit Report  Report Report Report Report Report        Physical Exam:    General Appearance:  Alert, oriented, no distress  Skin:  Warm and dry  Head and Neck:  No elevation of JVP, no carotid bruits  Cardiac Exam:  Rhythm is regular, S1 and S2 are normal, no murmur S3 or S4  Lungs:  Clear to auscultation  Extremities:  no edema  Neurologic:  No focal deficits  Psychiatric:  Appropriate mood and behavior    Lab Results:     CMP:  Recent Labs     11/26/24  1104 05/23/23  0600    139   K 4.3 3.9    107   CO2 25 23   ANIONGAP 11 13   BUN 12 9   CREATININE 0.64 0.67   EGFR >90  --    MG 2.30  --      Recent Labs     11/26/24  1104   ALBUMIN 4.9   ALKPHOS 49   ALT 15   AST 15   BILITOT 0.5     CBC:  Recent Labs     11/26/24  1104 05/23/23  2347 05/23/23  0600   WBC 6.0 CANCELED 10.2   HGB 13.7 CANCELED 9.6*   HCT 42.8 CANCELED 30.6*    CANCELED 292   MCV 96 CANCELED 98     COAG: No results for input(s): \"PTT\", \"INR\", \"HAUF\", \"DDIMERVTE\", \"HAPTOGLOBIN\", \"FIBRINOGEN\" in the last 10919 hours.  HEME/ENDO:  Recent Labs     11/26/24  1104   TSH 1.20      CARDIAC: No results for input(s): \"LDH\", \"CKMB\", \"TROPHS\", \"BNP\" in the last 43567 hours.    No lab exists for component: \"CK\", \"CKMBP\"  Recent Labs     11/26/24  1104   CHOL 136   HDL 42.4   TRIG 52       Test Results (personally reviewed):    Echocardiogram January 2025: Ejection fraction 55%    Holter monitor December 2024: Normal sinus rhythm, minimum heart rate 44 bpm, maximum heart rate 175 bpm, and average heart rate 78 bpm.  No episodes of SVT.    EKG: Normal sinus rhythm, no ventricular preexcitation    Apple watch recording January 20, 2025: Regular narrow QRS complex tachycardia 140 to 150 bpm.    Assessment/Plan  Problem List Items Addressed This Visit             ICD-10-CM    PSVT (paroxysmal supraventricular tachycardia) (CMS-HCC) - Primary I47.10     Jennifer has a history of SVT, and has been " experiencing these episodes for nearly 10 years.  She recently had an episode that she was able to capture with her Apple Watch.  This episode was a regular narrow QRS complex tachycardia.  A trial of a beta-blocker is recommended for initial therapy.  Atenolol 12.5 mg twice daily was prescribed.  I also discussed how to perform vagal maneuvers to try to terminate the arrhythmia.  We also discussed catheter ablation which can be a potential cure for this arrhythmia.  The ablation procedure and risks were discussed with the patient and her parents.  For now we will manage conservatively, and use a trial of beta-blocker therapy.  Ablation can be considered at any time in the future if the arrhythmia becomes more problematic.  Follow-up in 4 to 6 months.          James C Ramicone, DO

## 2025-01-22 ENCOUNTER — APPOINTMENT (OUTPATIENT)
Dept: CARDIOLOGY | Facility: CLINIC | Age: 25
End: 2025-01-22
Payer: COMMERCIAL

## 2025-01-22 VITALS
HEIGHT: 66 IN | WEIGHT: 103 LBS | BODY MASS INDEX: 16.55 KG/M2 | OXYGEN SATURATION: 99 % | DIASTOLIC BLOOD PRESSURE: 80 MMHG | HEART RATE: 82 BPM | SYSTOLIC BLOOD PRESSURE: 134 MMHG

## 2025-01-22 DIAGNOSIS — I47.10 PSVT (PAROXYSMAL SUPRAVENTRICULAR TACHYCARDIA) (CMS-HCC): Primary | ICD-10-CM

## 2025-01-22 PROCEDURE — 1036F TOBACCO NON-USER: CPT | Performed by: INTERNAL MEDICINE

## 2025-01-22 PROCEDURE — 99215 OFFICE O/P EST HI 40 MIN: CPT | Performed by: INTERNAL MEDICINE

## 2025-01-22 PROCEDURE — 3008F BODY MASS INDEX DOCD: CPT | Performed by: INTERNAL MEDICINE

## 2025-01-22 NOTE — LETTER
January 22, 2025     Lei De La Cruz DO  [address]  [facility], [address]  [facility] [address]    Patient: Jennifer Manuel   YOB: 2000   Date of Visit: 1/22/2025       Dear Dr. Lei De La Cruz DO:    Thank you for referring Jennifer Manuel to me for evaluation. Below are my notes for this consultation.  If you have questions, please do not hesitate to call me. I look forward to following your patient along with you.       Sincerely,     James C Ramicone, DO      CC: No Recipients  ______________________________________________________________________________________          Reason For Consult:    Supraventricular tachycardia    History Of Present Illness:    This is a 24-year-old female with palpitations.  She is being seen today in consultation with the request of Dr. Mcclain.  The patient reports having a history of SVT dating back nearly 10 years.  The episodes occur approximately once per month and last anywhere from 5 minutes up to 1 hour in duration.  The arrhythmia episodes start and stop abruptly.  She has not required intravenous adenosine for arrhythmia termination.  She has not tried vagal maneuvers.  Cardiology records reviewed today.    Past surgical history: History of a bony cyst requiring surgery    Social history: Non-smoker    Family history: Negative for premature CAD.  Negative for cardiac arrhythmias    Review of systems:  Other review of systems negative other than as outlined in HPI    Social History:  She reports that she has never smoked. She has never used smokeless tobacco. She reports that she does not drink alcohol and does not use drugs.    Family History:  Family History   Problem Relation Name Age of Onset   • Other (htn) Maternal Grandmother         Allergies:  Patient has no known allergies.    Medications:  Current Outpatient Medications   Medication Instructions   • atenolol (TENORMIN) 12.5 mg, oral, 2 times daily   • ergocalciferol  "(VITAMIN D-2) 50,000 Units, oral, Weekly       Vitals:      6/12/2023    12:09 PM 11/15/2023    12:04 PM 5/15/2024    11:39 AM 11/13/2024     1:17 PM 11/21/2024    11:49 AM 12/27/2024     1:29 PM 1/13/2025     2:12 PM   Vitals   Systolic 118    110 100 100   Diastolic 69    70 80 80   BP Location      Right arm    Heart Rate 84    76 81    Temp 36.8 °C (98.2 °F)         Resp 15         Height  1.676 m (5' 6\") 1.676 m (5' 6\") 1.676 m (5' 6\") 1.676 m (5' 6\") 1.676 m (5' 6\") 1.676 m (5' 6\")   Weight (lb)  95 95 95 106.75 103 103   BMI  15.33 kg/m2 15.33 kg/m2 15.33 kg/m2 17.23 kg/m2 16.62 kg/m2 16.62 kg/m2   BSA (m2)  1.42 m2 1.42 m2 1.42 m2 1.5 m2 1.47 m2 1.47 m2   Visit Report  Report Report Report Report Report        Physical Exam:    General Appearance:  Alert, oriented, no distress  Skin:  Warm and dry  Head and Neck:  No elevation of JVP, no carotid bruits  Cardiac Exam:  Rhythm is regular, S1 and S2 are normal, no murmur S3 or S4  Lungs:  Clear to auscultation  Extremities:  no edema  Neurologic:  No focal deficits  Psychiatric:  Appropriate mood and behavior    Lab Results:     CMP:  Recent Labs     11/26/24  1104 05/23/23  0600    139   K 4.3 3.9    107   CO2 25 23   ANIONGAP 11 13   BUN 12 9   CREATININE 0.64 0.67   EGFR >90  --    MG 2.30  --      Recent Labs     11/26/24  1104   ALBUMIN 4.9   ALKPHOS 49   ALT 15   AST 15   BILITOT 0.5     CBC:  Recent Labs     11/26/24  1104 05/23/23  2347 05/23/23  0600   WBC 6.0 CANCELED 10.2   HGB 13.7 CANCELED 9.6*   HCT 42.8 CANCELED 30.6*    CANCELED 292   MCV 96 CANCELED 98     COAG: No results for input(s): \"PTT\", \"INR\", \"HAUF\", \"DDIMERVTE\", \"HAPTOGLOBIN\", \"FIBRINOGEN\" in the last 49596 hours.  HEME/ENDO:  Recent Labs     11/26/24  1104   TSH 1.20      CARDIAC: No results for input(s): \"LDH\", \"CKMB\", \"TROPHS\", \"BNP\" in the last 83379 hours.    No lab exists for component: \"CK\", \"CKMBP\"  Recent Labs     11/26/24  1104   CHOL 136   HDL 42.4   TRIG " 52       Test Results (personally reviewed):    Echocardiogram January 2025: Ejection fraction 55%    Holter monitor December 2024: Normal sinus rhythm, minimum heart rate 44 bpm, maximum heart rate 175 bpm, and average heart rate 78 bpm.  No episodes of SVT.    EKG: Normal sinus rhythm, no ventricular preexcitation    Apple watch recording January 20, 2025: Regular narrow QRS complex tachycardia 140 to 150 bpm.    Assessment/Plan  Problem List Items Addressed This Visit             ICD-10-CM    PSVT (paroxysmal supraventricular tachycardia) (CMS-HCC) - Primary I47.10     Jennifer has a history of SVT, and has been experiencing these episodes for nearly 10 years.  She recently had an episode that she was able to capture with her Apple Watch.  This episode was a regular narrow QRS complex tachycardia.  A trial of a beta-blocker is recommended for initial therapy.  Atenolol 12.5 mg twice daily was prescribed.  I also discussed how to perform vagal maneuvers to try to terminate the arrhythmia.  We also discussed catheter ablation which can be a potential cure for this arrhythmia.  The ablation procedure and risks were discussed with the patient and her parents.  For now we will manage conservatively, and use a trial of beta-blocker therapy.  Ablation can be considered at any time in the future if the arrhythmia becomes more problematic.  Follow-up in 4 to 6 months.          James C Ramicone, DO

## 2025-01-22 NOTE — ASSESSMENT & PLAN NOTE
Jennifer has a history of SVT, and has been experiencing these episodes for nearly 10 years.  She recently had an episode that she was able to capture with her Apple Watch.  This episode was a regular narrow QRS complex tachycardia.  A trial of a beta-blocker is recommended for initial therapy.  Atenolol 12.5 mg twice daily was prescribed.  I also discussed how to perform vagal maneuvers to try to terminate the arrhythmia.  We also discussed catheter ablation which can be a potential cure for this arrhythmia.  The ablation procedure and risks were discussed with the patient and her parents.  For now we will manage conservatively, and use a trial of beta-blocker therapy.  Ablation can be considered at any time in the future if the arrhythmia becomes more problematic.  Follow-up in 4 to 6 months.

## 2025-04-30 PROBLEM — N39.0 URINARY TRACT INFECTION: Status: RESOLVED | Noted: 2018-02-23 | Resolved: 2025-04-30

## 2025-04-30 PROBLEM — B96.89 BACTERIAL CONJUNCTIVITIS OF BOTH EYES: Status: RESOLVED | Noted: 2024-05-15 | Resolved: 2025-04-30

## 2025-04-30 PROBLEM — H10.9 BACTERIAL CONJUNCTIVITIS OF BOTH EYES: Status: RESOLVED | Noted: 2024-05-15 | Resolved: 2025-04-30

## 2025-05-07 ENCOUNTER — APPOINTMENT (OUTPATIENT)
Dept: CARDIOLOGY | Facility: CLINIC | Age: 25
End: 2025-05-07
Payer: COMMERCIAL

## 2025-05-14 ENCOUNTER — APPOINTMENT (OUTPATIENT)
Dept: CARDIOLOGY | Facility: CLINIC | Age: 25
End: 2025-05-14
Payer: COMMERCIAL

## 2025-06-18 ENCOUNTER — APPOINTMENT (OUTPATIENT)
Dept: CARDIOLOGY | Facility: CLINIC | Age: 25
End: 2025-06-18
Payer: COMMERCIAL

## 2025-10-15 ENCOUNTER — APPOINTMENT (OUTPATIENT)
Dept: CARDIOLOGY | Facility: CLINIC | Age: 25
End: 2025-10-15
Payer: COMMERCIAL